# Patient Record
Sex: MALE | Race: WHITE | NOT HISPANIC OR LATINO | Employment: UNEMPLOYED | URBAN - NONMETROPOLITAN AREA
[De-identification: names, ages, dates, MRNs, and addresses within clinical notes are randomized per-mention and may not be internally consistent; named-entity substitution may affect disease eponyms.]

---

## 2024-03-05 ENCOUNTER — HOSPITAL ENCOUNTER (EMERGENCY)
Facility: HOSPITAL | Age: 34
Discharge: HOME | End: 2024-03-05
Attending: EMERGENCY MEDICINE | Admitting: EMERGENCY MEDICINE

## 2024-03-05 VITALS
DIASTOLIC BLOOD PRESSURE: 74 MMHG | HEART RATE: 93 BPM | SYSTOLIC BLOOD PRESSURE: 130 MMHG | TEMPERATURE: 98 F | RESPIRATION RATE: 18 BRPM | OXYGEN SATURATION: 100 %

## 2024-03-05 DIAGNOSIS — M79.602 LEFT ARM PAIN: Primary | ICD-10-CM

## 2024-03-05 LAB
ANION GAP SERPL CALCULATED.3IONS-SCNC: 10 MMOL/L
BASOPHILS # BLD AUTO: 0.03 THOUSANDS/ÂΜL (ref 0–0.1)
BASOPHILS NFR BLD AUTO: 0 % (ref 0–1)
BUN SERPL-MCNC: 20 MG/DL (ref 5–25)
CALCIUM SERPL-MCNC: 9.8 MG/DL (ref 8.4–10.2)
CHLORIDE SERPL-SCNC: 106 MMOL/L (ref 96–108)
CO2 SERPL-SCNC: 23 MMOL/L (ref 21–32)
CREAT SERPL-MCNC: 0.73 MG/DL (ref 0.6–1.3)
EOSINOPHIL # BLD AUTO: 0.16 THOUSAND/ÂΜL (ref 0–0.61)
EOSINOPHIL NFR BLD AUTO: 2 % (ref 0–6)
ERYTHROCYTE [DISTWIDTH] IN BLOOD BY AUTOMATED COUNT: 13.5 % (ref 11.6–15.1)
GFR SERPL CREATININE-BSD FRML MDRD: 121 ML/MIN/1.73SQ M
GLUCOSE SERPL-MCNC: 97 MG/DL (ref 65–140)
HCT VFR BLD AUTO: 42.9 % (ref 36.5–49.3)
HGB BLD-MCNC: 13 G/DL (ref 12–17)
IMM GRANULOCYTES # BLD AUTO: 0.04 THOUSAND/UL (ref 0–0.2)
IMM GRANULOCYTES NFR BLD AUTO: 1 % (ref 0–2)
LYMPHOCYTES # BLD AUTO: 2.63 THOUSANDS/ÂΜL (ref 0.6–4.47)
LYMPHOCYTES NFR BLD AUTO: 30 % (ref 14–44)
MCH RBC QN AUTO: 26.9 PG (ref 26.8–34.3)
MCHC RBC AUTO-ENTMCNC: 30.3 G/DL (ref 31.4–37.4)
MCV RBC AUTO: 89 FL (ref 82–98)
MONOCYTES # BLD AUTO: 0.73 THOUSAND/ÂΜL (ref 0.17–1.22)
MONOCYTES NFR BLD AUTO: 8 % (ref 4–12)
NEUTROPHILS # BLD AUTO: 5.28 THOUSANDS/ÂΜL (ref 1.85–7.62)
NEUTS SEG NFR BLD AUTO: 59 % (ref 43–75)
NRBC BLD AUTO-RTO: 0 /100 WBCS
PLATELET # BLD AUTO: 313 THOUSANDS/UL (ref 149–390)
PMV BLD AUTO: 8.8 FL (ref 8.9–12.7)
POTASSIUM SERPL-SCNC: 4 MMOL/L (ref 3.5–5.3)
RBC # BLD AUTO: 4.84 MILLION/UL (ref 3.88–5.62)
SODIUM SERPL-SCNC: 139 MMOL/L (ref 135–147)
WBC # BLD AUTO: 8.87 THOUSAND/UL (ref 4.31–10.16)

## 2024-03-05 PROCEDURE — 99284 EMERGENCY DEPT VISIT MOD MDM: CPT | Performed by: EMERGENCY MEDICINE

## 2024-03-05 PROCEDURE — 85025 COMPLETE CBC W/AUTO DIFF WBC: CPT | Performed by: EMERGENCY MEDICINE

## 2024-03-05 PROCEDURE — 99283 EMERGENCY DEPT VISIT LOW MDM: CPT

## 2024-03-05 PROCEDURE — 36415 COLL VENOUS BLD VENIPUNCTURE: CPT | Performed by: EMERGENCY MEDICINE

## 2024-03-05 PROCEDURE — 96360 HYDRATION IV INFUSION INIT: CPT

## 2024-03-05 PROCEDURE — 80048 BASIC METABOLIC PNL TOTAL CA: CPT | Performed by: EMERGENCY MEDICINE

## 2024-03-05 RX ORDER — BUPRENORPHINE AND NALOXONE 8; 2 MG/1; MG/1
8 FILM, SOLUBLE BUCCAL; SUBLINGUAL DAILY
COMMUNITY

## 2024-03-05 RX ADMIN — SODIUM CHLORIDE 1000 ML: 0.9 INJECTION, SOLUTION INTRAVENOUS at 16:32

## 2024-03-05 NOTE — ED PROVIDER NOTES
"Final Diagnosis:  1. Left arm pain        Chief Complaint   Patient presents with    Arm Pain     Pt reports that nursing staff at [facility] said he was acting \"weird.\" Pt states he has a granuloma to the his left arm. Pt states he is currently on antibiotics for it. Pt reports arm pain 4/10. Nursing staff at [facility] sent the patient in to be evaluated.      HPI  Patient presents for evaluation of right arm pain.  Patient tells me that this morning he he was at the [facility] facility where he has been residing and they told him that they wanted him to come in for an evaluation.  He says that he got into a verbal argument with one of the staff there and had to discuss with their supervisor.  Ultimately they agree that the patient will come in for evaluation of a wound to his left arm.  Patient states that he is a granuloma here there which they started him on Bactrim for.  Patient also states that at 1 point there was talk about him coming in for psych eval.    Later we were contacted from facility.  Per them nursing went to wake the patient up in the morning and he was speaking in Telugu and said that his wife was next to him giving birth.  This caused some concern and prompted them to send him in for further evaluation.      Unless otherwise specified:  - No language barrier.   - History obtained from patient.   - There are no limitations to the history obtained.  - Previous charting was reviewed    PMH:   has no past medical history on file.    PSH:   has a past surgical history that includes Appendectomy.       ROS:  Review of Systems   - 13 point ROS was performed and all are normal unless stated in the history above.   - Nursing note reviewed. Vitals reviewed.   - Orders placed by myself and/or advanced practitioner / resident.        PE:   Vitals:    03/05/24 1606 03/05/24 1715   BP: 130/74    BP Location: Right arm    Pulse: 88 93   Resp: 18    Temp: 98 °F (36.7 °C)    TempSrc: Temporal    SpO2: " 96% 100%     Vitals reviewed by me.     Patient has scarring in multiple areas of his body secondary to significant intravenous drug use.  He shows me an area on the radial aspect of his left antecubital fossa where there is a small area of firmness.  He states this wears granulomas.  There is no erythematous.  He states that it is much smaller since he started the antibiotics.  Denies any fever or chills.  He is alert and oriented.  Denies any suicidal homicidal ideations.  Unless otherwise specified above:    General: VS reviewed  Appears in NAD    Head: Normocephalic, atraumatic  Eyes: EOM-I.     ENT: Atraumatic external nose and ears.    No drooling.     Neck: No JVD.    CV: No pallor noted  Lungs:   No tachypnea  No respiratory distress    Abdomen:  Soft, non-tender, non-distended    MSK:   No obvious deformity    Skin: Dry, intact. No obvious rash.    Psychiatric/Behavioral: Appropriate mood and affect   Exam: deferred    Physical Exam     Procedures   A:  - Nursing note reviewed.                      No orders to display     Orders Placed This Encounter   Procedures    CBC and differential    Basic metabolic panel    Insert peripheral IV     Labs Reviewed   CBC AND DIFFERENTIAL - Abnormal       Result Value Ref Range Status    WBC 8.87  4.31 - 10.16 Thousand/uL Final    RBC 4.84  3.88 - 5.62 Million/uL Final    Hemoglobin 13.0  12.0 - 17.0 g/dL Final    Hematocrit 42.9  36.5 - 49.3 % Final    MCV 89  82 - 98 fL Final    MCH 26.9  26.8 - 34.3 pg Final    MCHC 30.3 (*) 31.4 - 37.4 g/dL Final    RDW 13.5  11.6 - 15.1 % Final    MPV 8.8 (*) 8.9 - 12.7 fL Final    Platelets 313  149 - 390 Thousands/uL Final    nRBC 0  /100 WBCs Final    Neutrophils Relative 59  43 - 75 % Final    Immat GRANS % 1  0 - 2 % Final    Lymphocytes Relative 30  14 - 44 % Final    Monocytes Relative 8  4 - 12 % Final    Eosinophils Relative 2  0 - 6 % Final    Basophils Relative 0  0 - 1 % Final    Neutrophils Absolute 5.28  1.85 - 7.62  Thousands/µL Final    Immature Grans Absolute 0.04  0.00 - 0.20 Thousand/uL Final    Lymphocytes Absolute 2.63  0.60 - 4.47 Thousands/µL Final    Monocytes Absolute 0.73  0.17 - 1.22 Thousand/µL Final    Eosinophils Absolute 0.16  0.00 - 0.61 Thousand/µL Final    Basophils Absolute 0.03  0.00 - 0.10 Thousands/µL Final   BASIC METABOLIC PANEL    Sodium 139  135 - 147 mmol/L Final    Potassium 4.0  3.5 - 5.3 mmol/L Final    Chloride 106  96 - 108 mmol/L Final    CO2 23  21 - 32 mmol/L Final    ANION GAP 10  mmol/L Final    BUN 20  5 - 25 mg/dL Final    Creatinine 0.73  0.60 - 1.30 mg/dL Final    Comment: Standardized to IDMS reference method    Glucose 97  65 - 140 mg/dL Final    Comment: If the patient is fasting, the ADA then defines impaired fasting glucose as > 100 mg/dL and diabetes as > or equal to 123 mg/dL.    Calcium 9.8  8.4 - 10.2 mg/dL Final    eGFR 121  ml/min/1.73sq m Final    Narrative:     National Kid[employer] Disease Foundation guidelines for Chronic Kidney Disease (CKD):     Stage 1 with normal or high GFR (GFR > 90 mL/min/1.73 square meters)    Stage 2 Mild CKD (GFR = 60-89 mL/min/1.73 square meters)    Stage 3A Moderate CKD (GFR = 45-59 mL/min/1.73 square meters)    Stage 3B Moderate CKD (GFR = 30-44 mL/min/1.73 square meters)    Stage 4 Severe CKD (GFR = 15-29 mL/min/1.73 square meters)    Stage 5 End Stage CKD (GFR <15 mL/min/1.73 square meters)  Note: GFR calculation is accurate only with a steady state creatinine         Final Diagnosis:  1. Left arm pain        P:  -Discussed options with patient.  Will provide general blood work to rule out acute abnormalities including anemia, signs of florid infection.  Electrolyte abnormalities.  - Laboratory analysis unremarkable.  When I went back into the room the patient was laying in bed with his eyes closed having a streaming conversation while knowing what else was in the room.  I asked him who he was talking to and he said that he talks to himself  because he does not have his phone and that the TV was not working.  I asked him again if he has any hallucinations.  He states sometimes he has hallucinations when he takes certain drugs and sometimes he will see kind of flashes but no other marisa hallucinations.  - I discussed with the patient that I think this behavior is likely will cause some concern from the [facility] staff.  It is possible that maybe had some mild, underlying psychiatric issue or may be that he is having some issues secondary to his prolonged years of drug use.  Either way currently the patient is not having any homicidal or suicidal ideations.  He appears to be taking care of himself well.  I do not believe that the patient would need admitted against his thoughts.  Return precautions reviewed.      Unless otherwise noted the patient's medications were reviewed and they should continue as directed.    Unless otherwise specified:  CC is exclusive from any separately billable procedures  CC is exclusive of treating other patients  CC is exclusive of teaching time     Medications   sodium chloride 0.9 % bolus 1,000 mL (0 mL Intravenous Stopped 3/5/24 1721)     Time reflects when diagnosis was documented in both MDM as applicable and the Disposition within this note       Time User Action Codes Description Comment    3/5/2024  5:16 PM Reggie Mccarthy Add [M79.601] Right arm pain     3/5/2024  5:17 PM Reggie Mccarthy Remove [M79.601] Right arm pain     3/5/2024  5:17 PM Reggie Mccarthy Add [M79.602] Left arm pain           ED Disposition       ED Disposition   Discharge    Condition   Stable    Date/Time   Tue Mar 5, 2024  5:16 PM    Comment   Jose Elias Mullins discharge to home/self care.                   Follow-up Information       Follow up With Specialties Details Why Contact Info Additional Information    [facility] Emergency Department Emergency Medicine  If symptoms worsen [address] [facility]  "[id number]  [phone] [facility] Emergency Department, [address], [place], [address]          Patient's Medications   Discharge Prescriptions    No medications on file     No discharge procedures on file.  Prior to Admission Medications   Prescriptions Last Dose Informant Patient Reported? Taking?   buprenorphine-naloxone (Suboxone) 8-2 mg   Yes Yes   Sig: Place 8 mg under the tongue daily      Facility-Administered Medications: None       Portions of the record may have been created with voice recognition software. Occasional wrong word or \"sound a like\" substitutions may have occurred due to the inherent limitations of voice recognition software. Read the chart carefully and recognize, using context, where substitutions have occurred.    Electronically signed by:  MD Reggie Harrison MD  03/05/24 1195    "

## 2024-03-05 NOTE — ED NOTES
[facility] contacted and made aware of patient up for discharge.      Penny Borrego RN  03/05/24 0334